# Patient Record
Sex: FEMALE | Employment: UNEMPLOYED | URBAN - METROPOLITAN AREA
[De-identification: names, ages, dates, MRNs, and addresses within clinical notes are randomized per-mention and may not be internally consistent; named-entity substitution may affect disease eponyms.]

---

## 2020-03-06 ENCOUNTER — TELEPHONE (OUTPATIENT)
Dept: NEUROSURGERY | Facility: CLINIC | Age: 70
End: 2020-03-06

## 2020-03-06 DIAGNOSIS — M47.14 THORACIC MYELOPATHY: Primary | ICD-10-CM

## 2020-03-06 NOTE — TELEPHONE ENCOUNTER
"Pt states she has experienced worsening pain on the left side of her neck and under her right should blade.  Pt states her legs "feel like Jell-O" and denies any falls at this time.  No history of neck or back Sx. Referral stresses thoracic myelopathy and surgeon in [place] referred her to [facility].   Pt will obtain an MRI T from her referring provider and obtain a disc w/her MRI C.  Pt scheduled for a consult w/Dr. Power in 07.2020 w/XRs.  Added to [facility], appt letter in the mail, V/U. ----- Message from Aym Pritchett MA sent at 3/6/2020 11:26 AM CST -----  This referral would be more suited for .  Thank You  ----- Message -----  From: Apryl Hay  Sent: 3/6/2020  10:30 AM CST  To: [facility] Staff    Good Morning,    Dr. Vo would like to refer the following patient to the Neurosurgery department. The patients diagnosis is thoracic myelopathy. I have scanned the patients referral and records into . Also, this patient will be traveling from [place].    Please let me know if I can help schedule in any way.    Thank you,   Apryl  Ext. [phone]  Clinic       "

## 2020-07-03 ENCOUNTER — TELEPHONE (OUTPATIENT)
Dept: NEUROSURGERY | Facility: CLINIC | Age: 70
End: 2020-07-03

## 2020-07-03 NOTE — TELEPHONE ENCOUNTER
Attempted to reach pt to confirm her consult w/Dr. Power, XRs, and to verify she has obtained a disc w/her outside MRI T.  BROOKE requesting a return call.

## 2020-07-06 ENCOUNTER — TELEPHONE (OUTPATIENT)
Dept: NEUROSURGERY | Facility: CLINIC | Age: 70
End: 2020-07-06

## 2020-07-06 ENCOUNTER — HOSPITAL ENCOUNTER (OUTPATIENT)
Dept: RADIOLOGY | Facility: OTHER | Age: 70
Discharge: HOME | End: 2020-07-06
Attending: NEUROLOGICAL SURGERY
Payer: MEDICARE

## 2020-07-06 ENCOUNTER — OFFICE VISIT (OUTPATIENT)
Dept: SPINE | Facility: CLINIC | Age: 70
End: 2020-07-06
Payer: MEDICARE

## 2020-07-06 VITALS — SYSTOLIC BLOOD PRESSURE: 126 MMHG | HEART RATE: 56 BPM | DIASTOLIC BLOOD PRESSURE: 64 MMHG

## 2020-07-06 DIAGNOSIS — M41.50 DEGENERATIVE SCOLIOSIS IN ADULT PATIENT: ICD-10-CM

## 2020-07-06 DIAGNOSIS — M47.14 THORACIC MYELOPATHY: ICD-10-CM

## 2020-07-06 DIAGNOSIS — G96.198 ARACHNOID CYST OF SPINE: Primary | ICD-10-CM

## 2020-07-06 PROCEDURE — 72082 XR SCOLIOSIS COMPLETE: ICD-10-PCS | Mod: 26,,, | Performed by: RADIOLOGY

## 2020-07-06 PROCEDURE — 72070 X-RAY EXAM THORAC SPINE 2VWS: CPT | Mod: 26,,, | Performed by: INTERNAL MEDICINE

## 2020-07-06 PROCEDURE — 99213 OFFICE O/P EST LOW 20 MIN: CPT | Mod: PBBFAC,25 | Performed by: NEUROLOGICAL SURGERY

## 2020-07-06 PROCEDURE — 99204 PR OFFICE/OUTPT VISIT, NEW, LEVL IV, 45-59 MIN: ICD-10-PCS | Mod: S$PBB,,, | Performed by: NEUROLOGICAL SURGERY

## 2020-07-06 PROCEDURE — 99999 PR PBB SHADOW E&M-EST. PATIENT-LVL III: ICD-10-PCS | Mod: PBBFAC,,, | Performed by: NEUROLOGICAL SURGERY

## 2020-07-06 PROCEDURE — 99204 OFFICE O/P NEW MOD 45 MIN: CPT | Mod: S$PBB,,, | Performed by: NEUROLOGICAL SURGERY

## 2020-07-06 PROCEDURE — 72082 X-RAY EXAM ENTIRE SPI 2/3 VW: CPT | Mod: TC,FY

## 2020-07-06 PROCEDURE — 72070 X-RAY EXAM THORAC SPINE 2VWS: CPT | Mod: TC,FY

## 2020-07-06 PROCEDURE — 99999 PR PBB SHADOW E&M-EST. PATIENT-LVL III: CPT | Mod: PBBFAC,,, | Performed by: NEUROLOGICAL SURGERY

## 2020-07-06 PROCEDURE — 72082 X-RAY EXAM ENTIRE SPI 2/3 VW: CPT | Mod: 26,,, | Performed by: RADIOLOGY

## 2020-07-06 PROCEDURE — 72070 XR THORACIC SPINE AP LATERAL: ICD-10-PCS | Mod: 26,,, | Performed by: INTERNAL MEDICINE

## 2020-07-06 RX ORDER — PANTOPRAZOLE SODIUM 40 MG/1
FOR SUSPENSION ORAL
COMMUNITY
Start: 2018-10-31

## 2020-07-06 RX ORDER — LINACLOTIDE 72 UG/1
CAPSULE, GELATIN COATED ORAL
COMMUNITY
Start: 2020-04-15

## 2020-07-06 RX ORDER — LISINOPRIL 10 MG/1
TABLET ORAL
COMMUNITY
Start: 2020-06-10

## 2020-07-06 RX ORDER — ZOLPIDEM TARTRATE 10 MG/1
TABLET ORAL
COMMUNITY
Start: 2018-10-31

## 2020-07-06 RX ORDER — METHIMAZOLE 5 MG/1
TABLET ORAL
COMMUNITY
Start: 2020-06-10

## 2020-07-06 RX ORDER — ATORVASTATIN CALCIUM 80 MG/1
TABLET, FILM COATED ORAL
COMMUNITY
Start: 2018-10-31

## 2020-07-06 RX ORDER — ATORVASTATIN CALCIUM 80 MG/1
TABLET, FILM COATED ORAL
COMMUNITY
Start: 2020-04-14

## 2020-07-06 RX ORDER — OXYCODONE HYDROCHLORIDE 5 MG/1
TABLET ORAL
COMMUNITY
Start: 2020-03-30

## 2020-07-06 RX ORDER — LISINOPRIL 10 MG/1
TABLET ORAL
COMMUNITY
Start: 2018-10-31

## 2020-07-06 RX ORDER — METOPROLOL SUCCINATE 50 MG/1
TABLET, EXTENDED RELEASE ORAL
COMMUNITY
Start: 2020-04-14

## 2020-07-06 RX ORDER — GABAPENTIN 800 MG/1
TABLET ORAL
COMMUNITY
Start: 2020-04-14

## 2020-07-06 RX ORDER — INSULIN GLARGINE 100 [IU]/ML
INJECTION, SOLUTION SUBCUTANEOUS
COMMUNITY
Start: 2020-06-23

## 2020-07-06 RX ORDER — FOLIC ACID 1 MG/1
TABLET ORAL
COMMUNITY
Start: 2020-06-05

## 2020-07-06 RX ORDER — PANTOPRAZOLE SODIUM 40 MG/1
TABLET, DELAYED RELEASE ORAL
COMMUNITY
Start: 2020-04-14

## 2020-07-06 RX ORDER — METOPROLOL SUCCINATE 50 MG/1
TABLET, EXTENDED RELEASE ORAL
COMMUNITY
Start: 2018-10-31

## 2020-07-06 RX ORDER — FOLIC ACID 1 MG/1
TABLET ORAL
COMMUNITY
Start: 2018-10-31

## 2020-07-06 RX ORDER — ZOLPIDEM TARTRATE 10 MG/1
TABLET ORAL
COMMUNITY
Start: 2020-05-02

## 2020-07-06 RX ORDER — NITROFURANTOIN MACROCRYSTALS 50 MG/1
CAPSULE ORAL
COMMUNITY
Start: 2020-04-14

## 2020-07-06 RX ORDER — CEPHALEXIN 500 MG/1
CAPSULE ORAL
COMMUNITY
Start: 2020-04-04

## 2020-07-06 NOTE — PROGRESS NOTES
"Dear Dr. Vo ,    Thank you for referring this patient to my clinic. The full details of my evaluation will also be forthcoming to you by letter.    CHIEF COMPLAINT:  Neck pain    I, Yanick Aburtoinaro, attest that this documentation has been prepared under the direction and in the presence of Chet Power MD.    HPI:  Pelon Bishop is a 70 y.o.  female with a PMHx of T2DM, chronic anemia, and MRSA, who is referred to me by Dr. Vo for evaluation of neck pain. She c/o neck pain and a "jello" sensation in her lower extremities. She reports waking up a few days before [place] with a kink in her neck that never went away. She has had ablatian treatments on her lower back for spondylolisthesis. Her mid-back pain is present but not debilitating. She has constant BLE weakness. 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 She reports a change in bladder function. She does not have a steady stream but denies other Sx. She has a history of MRSA but no current open wounds. She reports her hunched spine is prevalent through her family's medical history.      Review of patient's allergies indicates:  Not on File    No past medical history on file.  No past surgical history on file.  No family history on file.  Social History     Tobacco Use    Smoking status: Not on file   Substance Use Topics    Alcohol use: Not on file    Drug use: Not on file        Review of Systems   Constitutional: Negative.    HENT: Negative.    Eyes: Negative.    Respiratory: Negative.    Cardiovascular: Negative.    Gastrointestinal: Negative.    Endocrine: Negative.    Genitourinary: Negative.    Musculoskeletal: Positive for back pain, gait problem and neck pain.   Skin: Negative.    Allergic/Immunologic: Negative.    Neurological: Positive for weakness (BLE). Negative for light-headedness, numbness and headaches.   Hematological: Negative.    Psychiatric/Behavioral: Negative.        OBJECTIVE:   Vital Signs:   "     Physical Exam:    Vital signs: All nursing notes and vital signs reviewed -- afebrile, vital signs stable.  Constitutional: Patient sitting comfortably in chair. Appears well developed and well nourished.  Skin: Exposed areas are intact without abnormal markings, rashes or other lesions.  HEENT: Normocephalic. Normal conjunctivae.  Cardiovascular: Normal rate and regular rhythm.  Respiratory: Chest wall rises and falls symmetrically, without signs of respiratory distress.  Abdomen: Soft and non-tender.  Extremities: BLE deformities  Psych/Behavior: Normal affect.    Gait: Slow shuffling, mild ataxia     Neurological:    Mental status: Alert and oriented. Conversational and appropriate.       Cranial Nerves: VFF to confrontation. PERRL. EOMI without nystagmus. Facial STLT normal and symmetric. Strong, symmetric muscles of mastication. Facial strength full and symmetric. Hearing equal bilaterally to finger rub. Palate and uvula rise and fall normally in midline. Shoulder shrug 5/5 strength. Tongue midline.     Motor:    Upper:  Deltoids Triceps Biceps WE WF     R 5/5 5/5 5/5 5/5 5/5 5/5    L 5/5 5/5 5/5 5/5 5/5 5/5      Lower:  HF KE KF DF PF EHL    R 5/5 5/5 5/5 5/5 5/5 5/5    L 4+/5 5/5 5/5 5/5 5/5 4+/5     Sensory: Intact sensation to light touch in all extremities. Romberg negative.    Reflexes: Areflexic in bilateral patellas         DTR: 2+ symmetrically throughout.     Newman's: Negative.     Babinski's: Negative.     Clonus: Negative.    Cerebellar: Finger-to-nose and rapid alternating movements normal. Gait stable, fluid.    Spine:    Posture: Hunched Thoracic Spine    Bending: Full ROM with forward, back and lateral bending. No rib prominence with forward bend.    Cervical:      ROM: Full with flexion, extension, lateral rotation and ear-to-shoulder bend.      Midline TTP: Negative.     Spurling's test: Negative.     Lhermitte's: Negative.    Thoracic:        Midline TTP: Positive    Lumbar:      "Midline TTP: Negative     Straight Leg Test: Negative     Crossed Straight Leg Test: Negative     Sciatic notch tenderness: Negative.    Other:     SI joint TTP: Negative.     Greater trochanter TTP: Negative.     Tenderness with external/internal hip rotation: Negative.    Diagnostic Results:  All imaging was independently reviewed by me.    MRI C- spine, dated 1/22/2020:  1. Poor quality   2. Diffuse cervical spondylosis   3. 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 Axial views are poor quality  5. Focal ventral displacement of the spinal cord at approximately T7 or T8  6. Suspicious for a dorsal arachnoid web      AP and Lateral X-ray T- spine, dated 7/6/2020:  1. Thoracic hyperkyphosis        ASSESSMENT/PLAN:     Pelon Bishop has mild thoracic myelopathy marked by mild proximal leg weakness and subjective feeling that her legs are "jello". 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 I also suspect that her underlying degenerative kyphoscoliosis may be progressing, which could have caused the web to become more symptomatic recently, and may be the cause of her subacute neck pain at the CT junction. I recommend a T spine myelogram to evaluate the web further and a baseline scoliosis xray. Overall she is a poor surgical candidate given her diabetes, age, smoking and history of MRSA. She will follow up when these are complete.    The patient understands and agrees with the plan of care. All questions were answered.     1. Myelogram  2. Scoliosis X-Ray  3. RTC prn pending #1       I, Dr. Chet Power personally performed the services described in this documentation. All medical record entries made by the scribe, Yanick Sol, were at my direction and in my presence.  I have reviewed the chart and agree that the record reflects my personal performance and is " accurate and complete.      Chet Power M.D.  Department of Neurosurgery  [facility]

## 2020-07-17 ENCOUNTER — TELEPHONE (OUTPATIENT)
Dept: NEUROSURGERY | Facility: CLINIC | Age: 70
End: 2020-07-17

## 2020-07-17 NOTE — TELEPHONE ENCOUNTER
Faxed pt's CT order, demo, and clinic note to [facility] ([phone]). ----- Message from Mary Turpin MA sent at 7/16/2020 11:34 AM CDT -----  Regarding: FW: Patient Access    ----- Message -----  From: Alejandra Penny  Sent: 7/16/2020  11:16 AM CDT  To: Tono [facility] Staff  Subject: Patient Access                                   Name of Who is Calling:  Pelon Bishop      What is the request in detail:   Patient is asking to have orders for her Ct Scan faxed over to the hospital there in [place]. Please do so at your earliest convenience.       [facility] @ [place], [place] [phone] opt# [phone]      Call :  [phone]

## 2020-08-07 ENCOUNTER — TELEPHONE (OUTPATIENT)
Dept: NEUROSURGERY | Facility: CLINIC | Age: 70
End: 2020-08-07

## 2020-08-31 ENCOUNTER — OFFICE VISIT (OUTPATIENT)
Dept: SPINE | Facility: CLINIC | Age: 70
End: 2020-08-31
Payer: MEDICARE

## 2020-08-31 ENCOUNTER — TELEPHONE (OUTPATIENT)
Dept: SPINE | Facility: CLINIC | Age: 70
End: 2020-08-31

## 2020-08-31 VITALS — HEART RATE: 52 BPM | SYSTOLIC BLOOD PRESSURE: 136 MMHG | DIASTOLIC BLOOD PRESSURE: 57 MMHG

## 2020-08-31 DIAGNOSIS — G96.198 ARACHNOID CYST OF SPINE: Primary | ICD-10-CM

## 2020-08-31 DIAGNOSIS — M47.14 THORACIC MYELOPATHY: ICD-10-CM

## 2020-08-31 PROCEDURE — 99214 OFFICE O/P EST MOD 30 MIN: CPT | Mod: S$PBB,,, | Performed by: NEUROLOGICAL SURGERY

## 2020-08-31 PROCEDURE — 99214 PR OFFICE/OUTPT VISIT, EST, LEVL IV, 30-39 MIN: ICD-10-PCS | Mod: S$PBB,,, | Performed by: NEUROLOGICAL SURGERY

## 2020-08-31 PROCEDURE — 99999 PR PBB SHADOW E&M-EST. PATIENT-LVL V: ICD-10-PCS | Mod: PBBFAC,,, | Performed by: NEUROLOGICAL SURGERY

## 2020-08-31 PROCEDURE — 99215 OFFICE O/P EST HI 40 MIN: CPT | Mod: PBBFAC | Performed by: NEUROLOGICAL SURGERY

## 2020-08-31 PROCEDURE — 99999 PR PBB SHADOW E&M-EST. PATIENT-LVL V: CPT | Mod: PBBFAC,,, | Performed by: NEUROLOGICAL SURGERY

## 2020-08-31 RX ORDER — ALENDRONATE SODIUM 70 MG/1
TABLET ORAL
COMMUNITY
Start: 2020-07-20

## 2020-08-31 RX ORDER — ALPRAZOLAM 0.5 MG/1
0.5 TABLET ORAL 3 TIMES DAILY
COMMUNITY
Start: 2020-07-24

## 2020-08-31 RX ORDER — DOXYCYCLINE 100 MG/1
CAPSULE ORAL
COMMUNITY
Start: 2020-08-18
